# Patient Record
Sex: MALE
[De-identification: names, ages, dates, MRNs, and addresses within clinical notes are randomized per-mention and may not be internally consistent; named-entity substitution may affect disease eponyms.]

---

## 2024-03-19 PROBLEM — Z00.00 ENCOUNTER FOR PREVENTIVE HEALTH EXAMINATION: Status: ACTIVE | Noted: 2024-03-19

## 2024-06-20 ENCOUNTER — APPOINTMENT (OUTPATIENT)
Dept: PULMONOLOGY | Facility: CLINIC | Age: 49
End: 2024-06-20
Payer: COMMERCIAL

## 2024-06-20 ENCOUNTER — NON-APPOINTMENT (OUTPATIENT)
Age: 49
End: 2024-06-20

## 2024-06-20 VITALS
RESPIRATION RATE: 19 BRPM | HEIGHT: 70 IN | BODY MASS INDEX: 26.48 KG/M2 | OXYGEN SATURATION: 98 % | WEIGHT: 185 LBS | DIASTOLIC BLOOD PRESSURE: 76 MMHG | SYSTOLIC BLOOD PRESSURE: 120 MMHG | HEART RATE: 76 BPM

## 2024-06-20 DIAGNOSIS — Z87.891 PERSONAL HISTORY OF NICOTINE DEPENDENCE: ICD-10-CM

## 2024-06-20 DIAGNOSIS — R06.02 SHORTNESS OF BREATH: ICD-10-CM

## 2024-06-20 PROCEDURE — 99203 OFFICE O/P NEW LOW 30 MIN: CPT | Mod: 25

## 2024-06-20 PROCEDURE — 94010 BREATHING CAPACITY TEST: CPT

## 2024-06-20 PROCEDURE — 71046 X-RAY EXAM CHEST 2 VIEWS: CPT

## 2024-06-20 RX ORDER — METOPROLOL TARTRATE 75 MG/1
TABLET, FILM COATED ORAL
Refills: 0 | Status: ACTIVE | COMMUNITY

## 2024-06-20 RX ORDER — LISINOPRIL 30 MG/1
TABLET ORAL
Refills: 0 | Status: ACTIVE | COMMUNITY

## 2024-06-20 NOTE — ASSESSMENT
[FreeTextEntry1] : Here for pulmonary evaluation  HO steel dust exposure  10 PY smoking history.  Quit 15 years ago

## 2024-06-20 NOTE — PROCEDURE
[FreeTextEntry1] : CXR PA and Lateral  The costophrenic and cardiophrenic angles are sharp The lung parenchyma shows  ? increased interstitial markings.  No consolidations, or nodules  The Mediastinum is within normal limits No pleural effusions

## 2024-09-25 ENCOUNTER — APPOINTMENT (OUTPATIENT)
Dept: PULMONOLOGY | Facility: CLINIC | Age: 49
End: 2024-09-25

## 2024-12-02 ENCOUNTER — APPOINTMENT (OUTPATIENT)
Dept: PULMONOLOGY | Facility: CLINIC | Age: 49
End: 2024-12-02

## 2024-12-17 ENCOUNTER — APPOINTMENT (OUTPATIENT)
Dept: PULMONOLOGY | Facility: HOSPITAL | Age: 49
End: 2024-12-17

## 2024-12-17 ENCOUNTER — OUTPATIENT (OUTPATIENT)
Dept: OUTPATIENT SERVICES | Facility: HOSPITAL | Age: 49
LOS: 1 days | End: 2024-12-17
Payer: COMMERCIAL

## 2024-12-17 DIAGNOSIS — R06.02 SHORTNESS OF BREATH: ICD-10-CM

## 2024-12-17 PROCEDURE — 94060 EVALUATION OF WHEEZING: CPT | Mod: 26

## 2024-12-17 PROCEDURE — 94729 DIFFUSING CAPACITY: CPT

## 2024-12-17 PROCEDURE — 94727 GAS DIL/WSHOT DETER LNG VOL: CPT | Mod: 26

## 2024-12-17 PROCEDURE — 94070 EVALUATION OF WHEEZING: CPT

## 2024-12-17 PROCEDURE — 94729 DIFFUSING CAPACITY: CPT | Mod: 26

## 2024-12-17 PROCEDURE — 94664 DEMO&/EVAL PT USE INHALER: CPT

## 2024-12-17 PROCEDURE — 94726 PLETHYSMOGRAPHY LUNG VOLUMES: CPT

## 2024-12-18 DIAGNOSIS — R06.02 SHORTNESS OF BREATH: ICD-10-CM
